# Patient Record
Sex: FEMALE | Employment: FULL TIME | URBAN - METROPOLITAN AREA
[De-identification: names, ages, dates, MRNs, and addresses within clinical notes are randomized per-mention and may not be internally consistent; named-entity substitution may affect disease eponyms.]

---

## 2024-11-06 ENCOUNTER — CLINICAL SUPPORT (OUTPATIENT)
Dept: OTHER | Facility: CLINIC | Age: 39
End: 2024-11-06

## 2024-11-06 DIAGNOSIS — Z00.8 ENCOUNTER FOR OTHER GENERAL EXAMINATION: ICD-10-CM

## 2024-11-07 VITALS
SYSTOLIC BLOOD PRESSURE: 105 MMHG | BODY MASS INDEX: 38.61 KG/M2 | DIASTOLIC BLOOD PRESSURE: 72 MMHG | HEIGHT: 67 IN | WEIGHT: 246 LBS

## 2024-11-07 LAB
GLUCOSE SERPL-MCNC: 96 MG/DL (ref 60–140)
HDLC SERPL-MCNC: 43 MG/DL
POC CHOLESTEROL, LDL (DOCK): 66 MG/DL
POC CHOLESTEROL, TOTAL: 128 MG/DL
TRIGL SERPL-MCNC: 105 MG/DL
